# Patient Record
Sex: MALE | Race: WHITE
[De-identification: names, ages, dates, MRNs, and addresses within clinical notes are randomized per-mention and may not be internally consistent; named-entity substitution may affect disease eponyms.]

---

## 2017-08-24 NOTE — UC
Respiratory Complaint HPI





- HPI Summary


HPI Summary: 





82 year old male with c/o productive cough x 2 days, states when he mows the 

grass this happens and he mowed without a mask 2 days ago.  He flew home from 

[place] after he mowed the lawn. 2 separate flights < 90 min. no wheeze. he 

has prostate cancer and receives medication to treat the prostate cancer 4-5 

weeks ago and next one due next month. he has had non productive cough . denies 

CP, palpitations, wheezing, chest heaviness. 


[ End ]





- History of Current Complaint


Chief Complaint: UCRespiratory


Stated Complaint: COUGH SNEEZING SORE THROAT


Time Seen by Provider: 08/24/17 14:05


Hx Obtained From: Patient


Onset/Duration: Gradual Onset


Timing: Constant


Severity Initially: Moderate


Severity Currently: Moderate


Character: Cough: Nonproductive


Aggravating Factors: Allergens


Associated Signs And Symptoms: Positive: Wheezing


Related History: Similar Episode/Dx as:





- Allergies/Home Medications


Allergies/Adverse Reactions: 


 Allergies











Allergy/AdvReac Type Severity Reaction Status Date / Time


 


Adhesive Tape Allergy Intermediate Rash Verified 08/24/17 13:51


 


Erythromycin Allergy Intermediate Headache Verified 08/24/17 13:51


 


Lisinopril Allergy Intermediate Headache Verified 08/24/17 13:51


 


Penicillins Allergy Intermediate Hives Verified 08/24/17 13:51


 


Rofecoxib [From Vioxx] Allergy Intermediate Swelling Verified 08/24/17 13:51














PMH/Surg Hx/FS Hx/Imm Hx


Previously Healthy: Yes


Cardiovascular History: Hypertension


Respiratory History: Asthma


Cancer History: Prostate Cancer


Other History Of: Anticoagulant Therapy - low dose ASA





- Surgical History


Surgical History: Yes


Surgery Procedure, Year, and Place: Bowel Surgery; Inguinal Hernia Repair; 

Teeth Removed





- Family History


Known Family History: Positive: None





- Social History


Occupation: Retired


Lives: With Family


Alcohol Use: None


Substance Use Type: None


Smoking Status (MU): Never Smoked Tobacco





Review of Systems


Respiratory: Shortness Of Breath


All Other Systems Reviewed And Are Negative: Yes





Physical Exam


Triage Information Reviewed: Yes


Appearance: Well-Appearing, No Pain Distress, Well-Nourished


Vital Signs: 


 Initial Vital Signs











Temp  97.7 F   08/24/17 13:46


 


Pulse  74   08/24/17 13:46


 


Resp  18   08/24/17 13:46


 


BP  120/68   08/24/17 13:46


 


Pulse Ox  97   08/24/17 13:46











Vital Signs Reviewed: Yes


Eye Exam: Normal


ENT Exam: Normal


Dental Exam: Normal


Neck exam: Normal


Neck: Positive: 1


Respiratory Exam: Normal


Cardiovascular Exam: Normal


Musculoskeletal Exam: Normal


Neurological Exam: Normal


Psychological Exam: Normal


Skin Exam: Normal





UC Diagnostic Evaluation





- Laboratory


O2 Sat by Pulse Oximetry: 97





Respiratory Course/Dx





- Course


Course Of Treatment: With history of prostate cancer he is immunosuppressed but 

his symptoms today seem to be more related viral or allergy mediated , he will 

continue conservative treatment at this time and if develops fever, productive 

cough or worsening symptoms and start oral antibiotics. if any other concerns 

then go to ED for further eval. Completely normal physical / respiratory exam 

at this time





- Differential Dx/Diagnosis


Differential Diagnosis/HQI/PQRI: Asthma, Bronchitis, Laryngitis, Lower Resp 

Infection, Sinusitis


Provider Diagnoses: Upper respiratory infection





Discharge





- Discharge Plan


Condition: Good


Disposition: HOME


Prescriptions: 


Fluticasone NASAL * [Flonase *] 2 spray BOTH NARES DAILY #1 bottle


ceFUROXime TAB(*) [Ceftin  MG(*)] 250 mg PO BID #20 tab


Patient Education Materials:  Upper Respiratory Infection (ED)


Referrals: 


Irving Olvera MD [Primary Care Provider] - 4 Days


Additional Instructions: 


As we discussed if your symptoms do not improve over the next 48 hours then you 

may please start the antibiotic . Please use claritin and the flonase in the AM 

and benadryl in the PM to help reduce congestion and post nasal drip

## 2018-04-27 ENCOUNTER — HOSPITAL ENCOUNTER (EMERGENCY)
Dept: HOSPITAL 25 - UCCORT | Age: 83
Discharge: HOME | End: 2018-04-27
Payer: MEDICARE

## 2018-04-27 VITALS — SYSTOLIC BLOOD PRESSURE: 125 MMHG | DIASTOLIC BLOOD PRESSURE: 51 MMHG

## 2018-04-27 DIAGNOSIS — Z88.8: ICD-10-CM

## 2018-04-27 DIAGNOSIS — M17.12: ICD-10-CM

## 2018-04-27 DIAGNOSIS — Z88.3: ICD-10-CM

## 2018-04-27 DIAGNOSIS — B35.4: Primary | ICD-10-CM

## 2018-04-27 DIAGNOSIS — Z88.0: ICD-10-CM

## 2018-04-27 PROCEDURE — G0463 HOSPITAL OUTPT CLINIC VISIT: HCPCS

## 2018-04-27 PROCEDURE — 99212 OFFICE O/P EST SF 10 MIN: CPT

## 2018-04-27 NOTE — UC
Knee Pain HPI





- HPI Summary


HPI Summary: 


Per RN triage "c/o L knee pain that has been going on for a while. would like 

an Xray.


2- area behind R knee red x 3-4 weeks. States past few day, gold bond cream has 

helped it. "





-left knee pain pops when he turns in chair. no locking or giving out. no 

bruising. no swelling, no erythema. 





-rash is itchy. it was not improved w/ triple abx ointment.








- History of Current Complaint


Chief Complaint: UCSkin


Stated Complaint: SKIN COMPLAINT, COUGH


Time Seen by Provider: 04/27/18 16:10


Pain Intensity: 0





- Allergies/Home Medications


Allergies/Adverse Reactions: 


 Allergies











Allergy/AdvReac Type Severity Reaction Status Date / Time


 


Adhesive Tape Allergy Intermediate Rash Verified 08/24/17 13:51


 


erythromycin base Allergy  Headache Verified 04/27/18 15:28


 


lisinopril Allergy  Headache Verified 04/27/18 15:28


 


Penicillins Allergy  Hives Verified 04/27/18 15:28


 


rofecoxib [From Vioxx] Allergy  Swelling Verified 04/27/18 15:28











Home Medications: 


 Home Medications





Brimonidine/Timolol OPTH(NF) [Combigan OPHTH (NF)] 1 drop BOTH EYES BID 04/27/ 18 [History Confirmed 04/27/18]


Travoprost Z 0.004% OPHTH (NF) [Travatan Z 0.004% OPTH (NF)] 1 drop LEFT EYE 

DAILY 04/27/18 [History Confirmed 04/27/18]











PMH/Surg Hx/FS Hx/Imm Hx


Previously Healthy: Yes


Other History Of: Anticoagulant Therapy - low dose ASA





- Surgical History


Surgical History: Yes


Surgery Procedure, Year, and Place: Bowel Surgery; Inguinal Hernia Repair; 

Teeth Removed





- Family History


Known Family History: Positive: Hypertension





- Social History


Alcohol Use: None


Substance Use Type: None


Smoking Status (MU): Never Smoked Tobacco





Review of Systems


Constitutional: Negative


Skin: Negative


Eyes: Negative


ENT: Negative


Respiratory: Negative


Cardiovascular: Negative


Gastrointestinal: Negative


Genitourinary: Negative


Motor: Negative


Neurovascular: Negative


Musculoskeletal: Arthralgia


Neurological: Negative


Psychological: Negative


Is Patient Immunocompromised?: No


All Other Systems Reviewed And Are Negative: Yes





Physical Exam


Triage Information Reviewed: Yes


Appearance: Well-Appearing, No Pain Distress, Well-Nourished - very pleasant


Vital Signs: 


 Initial Vital Signs











Temp  97.8 F   04/27/18 15:35


 


Pulse  69   04/27/18 15:35


 


Resp  20   04/27/18 15:35


 


BP  125/51   04/27/18 15:35


 


Pulse Ox  97   04/27/18 15:35











Vital Signs Reviewed: Yes


Respiratory Exam: Normal


Respiratory: Positive: Lungs clear


Cardiovascular: Positive: RRR, No Murmur


Musculoskeletal Exam: Normal


Musculoskeletal: Positive: ROM Intact, Other: - non tender left knee, FROM. no v

/v laxity. no A/P drawer sign, neg Lachmans and Mcmurrys. stregth intact


Neurological Exam: Normal


Psychological Exam: Normal


Skin: Positive: Other - posterior right knee w/ softball size non-blanching 

erythema, cool to touch, no streaks. no d/c.





Knee Pain Course/Dx





- Course


Course Of Treatment: left knee xray - DJD.  -treat rash w. clotrimazole.  -

recommend PT and he is agreeable.





- Differential Dx/Diagnosis


Differential Diagnosis/HQI/PQRI: Cellulitis, Dislocation, Sprain, Strain, 

Tendonitis, Other - OA


Provider Diagnoses: tinea corporis.





Discharge





- Sign-Out/Discharge


Documenting (check all that apply): Post-Discharge Follow Up





- Discharge Plan


Condition: Stable


Disposition: HOME


Prescriptions: 


Clotrimazole 1% CREAM* [Clotrimazole 1%*] 1 applic TOPICAL BID 14 Days #60 gm


Patient Education Materials:  Osteoarthritis (ED), Skin Yeast Infection (ED)


Referrals: 


Irving Olvera MD [Primary Care Provider] - 1 Week


Additional Instructions: 


I have given you a prescription for physical therapy for your left knee. If you 

don't have signfiicant relief, you should talk to your PCP about seeing an 

orthopedist. 





- Billing Disposition and Condition


Condition: STABLE


Disposition: HOME

## 2018-04-27 NOTE — RAD
Indication: Left knee pain.



4 views of left knee are reviewed.



There is joint space narrowing in the medial compartment of the left knee. Degenerative

changes of the patellofemoral joint is noted. There is no fracture or dislocation.



IMPRESSION: Degenerative changes medial compartment left knee. Degenerative changes of the

patellofemoral joint is noted.

## 2018-06-20 ENCOUNTER — HOSPITAL ENCOUNTER (EMERGENCY)
Dept: HOSPITAL 25 - UCCORT | Age: 83
Discharge: HOME | End: 2018-06-20
Payer: MEDICARE

## 2018-06-20 VITALS — SYSTOLIC BLOOD PRESSURE: 131 MMHG | DIASTOLIC BLOOD PRESSURE: 63 MMHG

## 2018-06-20 DIAGNOSIS — Z88.1: ICD-10-CM

## 2018-06-20 DIAGNOSIS — R53.83: Primary | ICD-10-CM

## 2018-06-20 DIAGNOSIS — Z91.048: ICD-10-CM

## 2018-06-20 DIAGNOSIS — I10: ICD-10-CM

## 2018-06-20 DIAGNOSIS — Z85.46: ICD-10-CM

## 2018-06-20 DIAGNOSIS — R53.1: ICD-10-CM

## 2018-06-20 DIAGNOSIS — Z88.0: ICD-10-CM

## 2018-06-20 DIAGNOSIS — Z88.8: ICD-10-CM

## 2018-06-20 LAB
BASOPHILS # BLD AUTO: 0 10^3/UL (ref 0–0.2)
EOSINOPHIL # BLD AUTO: 0 10^3/UL (ref 0–0.6)
HCT VFR BLD AUTO: 16 % (ref 42–52)
HGB BLD-MCNC: 5.4 G/DL (ref 14–18)
LYMPHOCYTES # BLD AUTO: 0.7 10^3/UL (ref 1–4.8)
MCH RBC QN AUTO: 26 PG (ref 27–31)
MCHC RBC AUTO-ENTMCNC: 34 G/DL (ref 31–36)
MCV RBC AUTO: 77 FL (ref 80–94)
MONOCYTES # BLD AUTO: 0.4 10^3/UL (ref 0–0.8)
NEUTROPHILS # BLD AUTO: 0.1 10^3/UL (ref 1.5–7.7)
NRBC # BLD AUTO: 0 10^3/UL
NRBC BLD QL AUTO: 0.2
PLATELET # BLD AUTO: 122 10^3/UL (ref 150–450)
RBC # BLD AUTO: 2.08 10^6/UL (ref 4–5.4)
WBC # BLD AUTO: 1.2 10^3/UL (ref 3.5–10.8)

## 2018-06-20 PROCEDURE — 93005 ELECTROCARDIOGRAM TRACING: CPT

## 2018-06-20 PROCEDURE — 71046 X-RAY EXAM CHEST 2 VIEWS: CPT

## 2018-06-20 PROCEDURE — 36415 COLL VENOUS BLD VENIPUNCTURE: CPT

## 2018-06-20 PROCEDURE — 99211 OFF/OP EST MAY X REQ PHY/QHP: CPT

## 2018-06-20 PROCEDURE — 85025 COMPLETE CBC W/AUTO DIFF WBC: CPT

## 2018-06-20 PROCEDURE — 85060 BLOOD SMEAR INTERPRETATION: CPT

## 2018-06-20 PROCEDURE — G0463 HOSPITAL OUTPT CLINIC VISIT: HCPCS

## 2018-06-20 NOTE — UC
UC General HPI





- HPI Summary


HPI Summary: 





Pt c/o worsening fatigue over last 2 weeks. Denies, chest pain and SOB.  Has 

history of chronic anemia, prostate cancer and HTN . 





- History of Current Complaint


Chief Complaint: UCGeneralIllness


Stated Complaint: FATIGUE/ ONGOING SOB


Time Seen by Provider: 06/20/18 16:05


Hx Obtained From: Patient


Onset/Duration: Gradual Onset, Lasting Weeks, Still Present, Worse Since - onset


Timing: Constant


Onset Severity: Mild


Current Severity: Mild


Pain Intensity: 0


Associated Signs & Symptoms: Positive: Weakness





- Allergy/Home Medications


Allergies/Adverse Reactions: 


 Allergies











Allergy/AdvReac Type Severity Reaction Status Date / Time


 


Adhesive Tape Allergy Intermediate Rash Verified 06/20/18 16:04


 


erythromycin base Allergy  Headache Verified 06/20/18 16:04


 


lisinopril Allergy  Headache Verified 06/20/18 16:04


 


Penicillins Allergy  Hives Verified 06/20/18 16:04


 


rofecoxib [From Vioxx] Allergy  Swelling Verified 06/20/18 16:04














PMH/Surg Hx/FS Hx/Imm Hx


Previously Healthy: Yes


Cardiovascular History: Hypertension


Cancer History: Prostate Cancer


Other History Of: Anticoagulant Therapy - low dose ASA





- Surgical History


Surgical History: Yes


Surgery Procedure, Year, and Place: Bowel Surgery; Inguinal Hernia Repair; 

Teeth Removed





- Family History


Known Family History: Positive: None, Hypertension





- Social History


Occupation: Retired


Lives: Alone


Alcohol Use: None


Substance Use Type: None


Smoking Status (MU): Never Smoked Tobacco


Have You Smoked in the Last Year: No





Review of Systems


Constitutional: Fatigue


Skin: Other - pale


Eyes: Negative


ENT: Negative


Respiratory: Negative


Cardiovascular: Negative


Gastrointestinal: Negative


Genitourinary: Negative


Motor: Weakness


Neurovascular: Negative


Musculoskeletal: Negative


Neurological: Headache


Psychological: Negative


Is Patient Immunocompromised?: No


All Other Systems Reviewed And Are Negative: Yes





Physical Exam


Triage Information Reviewed: Yes


Appearance: Other: - pale,


Vital Signs: 


 Initial Vital Signs











Temp  98.6 F   06/20/18 15:57


 


Pulse  79   06/20/18 15:57


 


Resp  14   06/20/18 15:57


 


BP  131/63   06/20/18 15:57


 


Pulse Ox  98   06/20/18 15:57











Vital Signs Reviewed: Yes


Eye Exam: Other


Eyes: Positive: Other: - pale conjunctiva


ENT Exam: Normal


Dental Exam: Normal


Neck exam: Normal


Respiratory Exam: Normal


Respiratory: Positive: Normal breath sounds


Cardiovascular Exam: Normal


Cardiovascular: Positive: Murmur:Sys:Grade _?_/VI


Abdominal Exam: Normal


Abdomen Description: Positive: Nontender


Musculoskeletal Exam: Other


Musculoskeletal: Positive: Edema @ - 1+ pitting edema, at baseline per pt


Neurological Exam: Normal


Psychological Exam: Normal


Skin Exam: Other - pale





Diagnostics





- Radiology


  ** No standard instances


Radiology Interpretation Completed By: Radiologist - IMPRESSION: No 

radiographic evidence of acute cardiopulmonary disease.





Course/Dx





- Differential Dx - Multi-Symptom


Provider Diagnoses: fatigue.  weakness





Discharge





- Sign-Out/Discharge


Documenting (check all that apply): Discharge/Admit/Transfer





- Discharge Plan


Condition: Stable


Disposition: HOME


Patient Education Materials:  Weakness (ED), Fatigue (ED)


Referrals: 


Irving Olvera MD [Primary Care Provider] - As Soon As Possible


Sammi Ryan MD [Medical Doctor] - If Needed





- Billing Disposition and Condition


Condition: STABLE


Disposition: Home

## 2018-06-20 NOTE — RAD
INDICATION: Shortness of breath. 



COMPARISON: Chest x-ray April 17, 2017



TECHNIQUE: PA and lateral views of the chest were obtained.



FINDINGS:



The heart and mediastinum are normal in size and contour.



The lungs are grossly clear. There is no evidence of large pleural effusion.



Visualized bones are normal for the patient's age.



There is no radiographic evidence of free air beneath the diaphragm



IMPRESSION: 

No radiographic evidence of acute cardiopulmonary disease.